# Patient Record
Sex: FEMALE | Race: BLACK OR AFRICAN AMERICAN | NOT HISPANIC OR LATINO
[De-identification: names, ages, dates, MRNs, and addresses within clinical notes are randomized per-mention and may not be internally consistent; named-entity substitution may affect disease eponyms.]

---

## 2019-01-01 PROBLEM — Z00.00 ENCOUNTER FOR PREVENTIVE HEALTH EXAMINATION: Status: ACTIVE | Noted: 2019-01-01

## 2019-01-08 ENCOUNTER — APPOINTMENT (OUTPATIENT)
Dept: OPHTHALMOLOGY | Facility: CLINIC | Age: 54
End: 2019-01-08
Payer: MEDICARE

## 2019-01-08 PROCEDURE — 92004 COMPRE OPH EXAM NEW PT 1/>: CPT

## 2019-02-07 ENCOUNTER — RX RENEWAL (OUTPATIENT)
Age: 54
End: 2019-02-07

## 2019-02-12 ENCOUNTER — APPOINTMENT (OUTPATIENT)
Dept: OPHTHALMOLOGY | Facility: CLINIC | Age: 54
End: 2019-02-12
Payer: MEDICARE

## 2019-02-12 PROCEDURE — 92014 COMPRE OPH EXAM EST PT 1/>: CPT

## 2019-02-12 PROCEDURE — 92134 CPTRZ OPH DX IMG PST SGM RTA: CPT

## 2019-03-13 ENCOUNTER — RX RENEWAL (OUTPATIENT)
Age: 54
End: 2019-03-13

## 2019-03-13 RX ORDER — PREDNISOLONE ACETATE 10 MG/ML
1 SUSPENSION/ DROPS OPHTHALMIC 4 TIMES DAILY
Qty: 1 | Refills: 3 | Status: ACTIVE | COMMUNITY
Start: 2019-01-11 | End: 1900-01-01

## 2019-03-15 ENCOUNTER — APPOINTMENT (OUTPATIENT)
Dept: OPHTHALMOLOGY | Facility: CLINIC | Age: 54
End: 2019-03-15
Payer: MEDICARE

## 2019-03-15 PROCEDURE — 76516 ECHO EXAM OF EYE: CPT

## 2019-03-15 PROCEDURE — 92014 COMPRE OPH EXAM EST PT 1/>: CPT

## 2019-03-15 RX ORDER — PREDNISONE 10 MG/1
10 TABLET ORAL
Qty: 42 | Refills: 3 | Status: ACTIVE | COMMUNITY
Start: 2019-03-15 | End: 1900-01-01

## 2019-03-20 ENCOUNTER — APPOINTMENT (OUTPATIENT)
Dept: OPHTHALMOLOGY | Facility: AMBULATORY SURGERY CENTER | Age: 54
End: 2019-03-20
Payer: MEDICARE

## 2019-03-20 ENCOUNTER — OUTPATIENT (OUTPATIENT)
Dept: OUTPATIENT SERVICES | Facility: HOSPITAL | Age: 54
LOS: 1 days | Discharge: HOME | End: 2019-03-20

## 2019-03-20 PROCEDURE — 67036 REMOVAL OF INNER EYE FLUID: CPT | Mod: RT

## 2019-03-20 PROCEDURE — 66986 EXCHANGE LENS PROSTHESIS: CPT | Mod: RT

## 2019-03-21 ENCOUNTER — APPOINTMENT (OUTPATIENT)
Dept: OPHTHALMOLOGY | Facility: CLINIC | Age: 54
End: 2019-03-21
Payer: MEDICARE

## 2019-03-21 ENCOUNTER — RX RENEWAL (OUTPATIENT)
Age: 54
End: 2019-03-21

## 2019-03-21 DIAGNOSIS — H57.10 OCULAR PAIN, UNSPECIFIED EYE: ICD-10-CM

## 2019-03-21 PROCEDURE — 99024 POSTOP FOLLOW-UP VISIT: CPT

## 2019-03-21 RX ORDER — PREDNISOLONE ACETATE 10 MG/ML
1 SUSPENSION/ DROPS OPHTHALMIC 4 TIMES DAILY
Qty: 1 | Refills: 3 | Status: ACTIVE | COMMUNITY
Start: 2019-03-21 | End: 1900-01-01

## 2019-03-21 RX ORDER — TRAMADOL HYDROCHLORIDE AND ACETAMINOPHEN 37.5; 325 MG/1; MG/1
37.5-325 TABLET, FILM COATED ORAL 3 TIMES DAILY
Qty: 20 | Refills: 0 | Status: ACTIVE | COMMUNITY
Start: 2019-03-21 | End: 1900-01-01

## 2019-03-21 RX ORDER — PREDNISONE 20 MG/1
20 TABLET ORAL DAILY
Qty: 90 | Refills: 2 | Status: ACTIVE | COMMUNITY
Start: 2019-03-21 | End: 1900-01-01

## 2019-03-21 RX ORDER — MOXIFLOXACIN OPHTHALMIC 5 MG/ML
0.5 SOLUTION/ DROPS OPHTHALMIC
Qty: 1 | Refills: 0 | Status: ACTIVE | COMMUNITY
Start: 2019-03-21 | End: 1900-01-01

## 2019-03-22 RX ORDER — PREDNISONE 10 MG/1
10 TABLET ORAL
Qty: 200 | Refills: 3 | Status: ACTIVE | COMMUNITY
Start: 2019-03-22 | End: 1900-01-01

## 2019-03-25 ENCOUNTER — APPOINTMENT (OUTPATIENT)
Dept: OPHTHALMOLOGY | Facility: CLINIC | Age: 54
End: 2019-03-25
Payer: MEDICARE

## 2019-03-25 ENCOUNTER — INPATIENT (INPATIENT)
Facility: HOSPITAL | Age: 54
LOS: 2 days | Discharge: HOME | DRG: 125 | End: 2019-03-28
Payer: MEDICARE

## 2019-03-25 VITALS
HEART RATE: 69 BPM | SYSTOLIC BLOOD PRESSURE: 163 MMHG | HEIGHT: 65 IN | TEMPERATURE: 98 F | RESPIRATION RATE: 18 BRPM | OXYGEN SATURATION: 97 % | WEIGHT: 175.93 LBS | DIASTOLIC BLOOD PRESSURE: 79 MMHG

## 2019-03-25 DIAGNOSIS — Z86.69 PERSONAL HISTORY OF OTHER DISEASES OF THE NERVOUS SYSTEM AND SENSE ORGANS: ICD-10-CM

## 2019-03-25 DIAGNOSIS — H26.9 UNSPECIFIED CATARACT: ICD-10-CM

## 2019-03-25 DIAGNOSIS — Z98.890 OTHER SPECIFIED POSTPROCEDURAL STATES: Chronic | ICD-10-CM

## 2019-03-25 DIAGNOSIS — R63.8 OTHER SYMPTOMS AND SIGNS CONCERNING FOOD AND FLUID INTAKE: ICD-10-CM

## 2019-03-25 DIAGNOSIS — Z86.19 PERSONAL HISTORY OF OTHER INFECTIOUS AND PARASITIC DISEASES: ICD-10-CM

## 2019-03-25 DIAGNOSIS — H27.10 UNSPECIFIED DISLOCATION OF LENS: ICD-10-CM

## 2019-03-25 DIAGNOSIS — C50.919 MALIGNANT NEOPLASM OF UNSPECIFIED SITE OF UNSPECIFIED FEMALE BREAST: ICD-10-CM

## 2019-03-25 DIAGNOSIS — H46.9 UNSPECIFIED OPTIC NEURITIS: ICD-10-CM

## 2019-03-25 DIAGNOSIS — Z91.89 OTHER SPECIFIED PERSONAL RISK FACTORS, NOT ELSEWHERE CLASSIFIED: ICD-10-CM

## 2019-03-25 DIAGNOSIS — Z90.13 ACQUIRED ABSENCE OF BILATERAL BREASTS AND NIPPLES: Chronic | ICD-10-CM

## 2019-03-25 DIAGNOSIS — H35.81 RETINAL EDEMA: ICD-10-CM

## 2019-03-25 LAB
ALBUMIN SERPL ELPH-MCNC: 4.1 G/DL — SIGNIFICANT CHANGE UP (ref 3.3–5)
ALP SERPL-CCNC: 87 U/L — SIGNIFICANT CHANGE UP (ref 40–120)
ALT FLD-CCNC: 20 U/L — SIGNIFICANT CHANGE UP (ref 10–45)
ANION GAP SERPL CALC-SCNC: 12 MMOL/L — SIGNIFICANT CHANGE UP (ref 5–17)
AST SERPL-CCNC: 14 U/L — SIGNIFICANT CHANGE UP (ref 10–40)
BASOPHILS # BLD AUTO: 0.02 K/UL — SIGNIFICANT CHANGE UP (ref 0–0.2)
BASOPHILS NFR BLD AUTO: 0.2 % — SIGNIFICANT CHANGE UP (ref 0–2)
BILIRUB SERPL-MCNC: 0.2 MG/DL — SIGNIFICANT CHANGE UP (ref 0.2–1.2)
BUN SERPL-MCNC: 20 MG/DL — SIGNIFICANT CHANGE UP (ref 7–23)
CALCIUM SERPL-MCNC: 9.5 MG/DL — SIGNIFICANT CHANGE UP (ref 8.4–10.5)
CHLORIDE SERPL-SCNC: 105 MMOL/L — SIGNIFICANT CHANGE UP (ref 96–108)
CO2 SERPL-SCNC: 24 MMOL/L — SIGNIFICANT CHANGE UP (ref 22–31)
CREAT SERPL-MCNC: 0.6 MG/DL — SIGNIFICANT CHANGE UP (ref 0.5–1.3)
CRP SERPL-MCNC: 0.26 MG/DL — SIGNIFICANT CHANGE UP (ref 0–0.4)
EOSINOPHIL # BLD AUTO: 0 K/UL — SIGNIFICANT CHANGE UP (ref 0–0.5)
EOSINOPHIL NFR BLD AUTO: 0 % — SIGNIFICANT CHANGE UP (ref 0–6)
ERYTHROCYTE [SEDIMENTATION RATE] IN BLOOD: 27 MM/HR — HIGH
EXTRA SST TUBE: SIGNIFICANT CHANGE UP
GLUCOSE BLDC GLUCOMTR-MCNC: 224 MG/DL — HIGH (ref 70–99)
GLUCOSE SERPL-MCNC: 207 MG/DL — HIGH (ref 70–99)
HCT VFR BLD CALC: 40.3 % — SIGNIFICANT CHANGE UP (ref 34.5–45)
HGB BLD-MCNC: 13.1 G/DL — SIGNIFICANT CHANGE UP (ref 11.5–15.5)
IMM GRANULOCYTES NFR BLD AUTO: 0.5 % — SIGNIFICANT CHANGE UP (ref 0–1.5)
INR BLD: 1.09 — SIGNIFICANT CHANGE UP (ref 0.88–1.16)
LYMPHOCYTES # BLD AUTO: 2.46 K/UL — SIGNIFICANT CHANGE UP (ref 1–3.3)
LYMPHOCYTES # BLD AUTO: 23.8 % — SIGNIFICANT CHANGE UP (ref 13–44)
MCHC RBC-ENTMCNC: 27.6 PG — SIGNIFICANT CHANGE UP (ref 27–34)
MCHC RBC-ENTMCNC: 32.5 GM/DL — SIGNIFICANT CHANGE UP (ref 32–36)
MCV RBC AUTO: 85 FL — SIGNIFICANT CHANGE UP (ref 80–100)
MONOCYTES # BLD AUTO: 0.09 K/UL — SIGNIFICANT CHANGE UP (ref 0–0.9)
MONOCYTES NFR BLD AUTO: 0.9 % — LOW (ref 2–14)
NEUTROPHILS # BLD AUTO: 7.71 K/UL — HIGH (ref 1.8–7.4)
NEUTROPHILS NFR BLD AUTO: 74.6 % — SIGNIFICANT CHANGE UP (ref 43–77)
NRBC # BLD: 0 /100 WBCS — SIGNIFICANT CHANGE UP (ref 0–0)
PLATELET # BLD AUTO: 323 K/UL — SIGNIFICANT CHANGE UP (ref 150–400)
POTASSIUM SERPL-MCNC: 3.7 MMOL/L — SIGNIFICANT CHANGE UP (ref 3.5–5.3)
POTASSIUM SERPL-SCNC: 3.7 MMOL/L — SIGNIFICANT CHANGE UP (ref 3.5–5.3)
PROT SERPL-MCNC: 7.9 G/DL — SIGNIFICANT CHANGE UP (ref 6–8.3)
PROTHROM AB SERPL-ACNC: 12.3 SEC — SIGNIFICANT CHANGE UP (ref 10–12.9)
RBC # BLD: 4.74 M/UL — SIGNIFICANT CHANGE UP (ref 3.8–5.2)
RBC # FLD: 14.1 % — SIGNIFICANT CHANGE UP (ref 10.3–14.5)
SODIUM SERPL-SCNC: 141 MMOL/L — SIGNIFICANT CHANGE UP (ref 135–145)
WBC # BLD: 10.33 K/UL — SIGNIFICANT CHANGE UP (ref 3.8–10.5)
WBC # FLD AUTO: 10.33 K/UL — SIGNIFICANT CHANGE UP (ref 3.8–10.5)

## 2019-03-25 PROCEDURE — 99223 1ST HOSP IP/OBS HIGH 75: CPT | Mod: GC

## 2019-03-25 PROCEDURE — 70543 MRI ORBT/FAC/NCK W/O &W/DYE: CPT | Mod: 26

## 2019-03-25 PROCEDURE — 92014 COMPRE OPH EXAM EST PT 1/>: CPT | Mod: 24

## 2019-03-25 PROCEDURE — 93010 ELECTROCARDIOGRAM REPORT: CPT

## 2019-03-25 PROCEDURE — 99285 EMERGENCY DEPT VISIT HI MDM: CPT | Mod: 25

## 2019-03-25 RX ORDER — PANTOPRAZOLE SODIUM 20 MG/1
40 TABLET, DELAYED RELEASE ORAL
Qty: 0 | Refills: 0 | Status: DISCONTINUED | OUTPATIENT
Start: 2019-03-25 | End: 2019-03-28

## 2019-03-25 RX ORDER — DEXTROSE 50 % IN WATER 50 %
15 SYRINGE (ML) INTRAVENOUS ONCE
Qty: 0 | Refills: 0 | Status: DISCONTINUED | OUTPATIENT
Start: 2019-03-25 | End: 2019-03-28

## 2019-03-25 RX ORDER — DEXTROSE 50 % IN WATER 50 %
25 SYRINGE (ML) INTRAVENOUS ONCE
Qty: 0 | Refills: 0 | Status: DISCONTINUED | OUTPATIENT
Start: 2019-03-25 | End: 2019-03-28

## 2019-03-25 RX ORDER — INSULIN LISPRO 100/ML
VIAL (ML) SUBCUTANEOUS
Qty: 0 | Refills: 0 | Status: DISCONTINUED | OUTPATIENT
Start: 2019-03-25 | End: 2019-03-28

## 2019-03-25 RX ORDER — CIPROFLOXACIN HCL 0.3 %
1 DROPS OPHTHALMIC (EYE)
Qty: 0 | Refills: 0 | Status: DISCONTINUED | OUTPATIENT
Start: 2019-03-25 | End: 2019-03-28

## 2019-03-25 RX ORDER — MOXIFLOXACIN HCL 0.5 %
1 DROPS OPHTHALMIC (EYE)
Qty: 0 | Refills: 0 | Status: DISCONTINUED | OUTPATIENT
Start: 2019-03-25 | End: 2019-03-25

## 2019-03-25 RX ORDER — DEXTROSE 50 % IN WATER 50 %
12.5 SYRINGE (ML) INTRAVENOUS ONCE
Qty: 0 | Refills: 0 | Status: DISCONTINUED | OUTPATIENT
Start: 2019-03-25 | End: 2019-03-28

## 2019-03-25 RX ORDER — PREDNISOLONE SODIUM PHOSPHATE 1 %
1 DROPS OPHTHALMIC (EYE)
Qty: 0 | Refills: 0 | Status: DISCONTINUED | OUTPATIENT
Start: 2019-03-25 | End: 2019-03-28

## 2019-03-25 RX ORDER — GLUCAGON INJECTION, SOLUTION 0.5 MG/.1ML
1 INJECTION, SOLUTION SUBCUTANEOUS ONCE
Qty: 0 | Refills: 0 | Status: DISCONTINUED | OUTPATIENT
Start: 2019-03-25 | End: 2019-03-28

## 2019-03-25 RX ORDER — SODIUM CHLORIDE 9 MG/ML
1000 INJECTION, SOLUTION INTRAVENOUS
Qty: 0 | Refills: 0 | Status: DISCONTINUED | OUTPATIENT
Start: 2019-03-25 | End: 2019-03-28

## 2019-03-25 RX ADMIN — Medication 50 MILLIGRAM(S): at 18:55

## 2019-03-25 RX ADMIN — Medication 1 DROP(S): at 22:36

## 2019-03-25 RX ADMIN — Medication 50 MILLIGRAM(S): at 22:36

## 2019-03-25 RX ADMIN — Medication 2: at 22:36

## 2019-03-25 NOTE — H&P ADULT - PROBLEM SELECTOR PLAN 1
Pt with sudden onset redness, pain, and NLP on post-op day 1 of lens exchange. Vision improved slightly with PO steroids, now Light perception.   -1g solumedrol QD x3 days  -MR orbit  -appreciate ophtho recs. Pt with sudden onset redness, pain, and NLP on post-op day 1 of lens exchange. Vision improved slightly with PO steroids, now Light perception. no APD noted on exam.  Differential includes inflammatory (MS, NMO) vs ischemic (no risk factors) vs infectious (not febrile, no leukocytosis), Pt denies peripheral neuropathy, facial droop, or dysarthria. However does endorse occasional vertigo since february and one fall in february 2/2 knee weakness.   -1g solumedrol QD x3 days  -MR orbit  -appreciate ophtho recs. Pt with sudden onset redness, pain, and NLP on post-op day 1 of lens exchange. Vision improved slightly with PO steroids, now Light perception. no APD noted on exam.  Differential includes inflammatory (MS, NMO) vs ischemic (no risk factors) vs infectious (not febrile, no leukocytosis), Pt denies peripheral neuropathy, facial droop, or dysarthria. However does endorse occasional vertigo since february and one fall in february 2/2 knee weakness.   -1g solumedrol QD x3 days with daily PPI   -MR orbit urgent  -appreciate ophtho recs  -call dr. flores in the AM for collateral

## 2019-03-25 NOTE — ED PROVIDER NOTE - CLINICAL SUMMARY MEDICAL DECISION MAKING FREE TEXT BOX
+ traumatic optic neuropathy, admission for MR and 3 days of IV solumedrol 1 G daily as per Dr. De Guzman's recommendations.

## 2019-03-25 NOTE — ED ADULT NURSE NOTE - OBJECTIVE STATEMENT
53 y/o female w/ hx of cataract surgery on right eye presents to the ED c/ 53 y/o female w/ hx of cataract surgery on right eye 5 days ago presents to ED for IV Solumedrol for optic neuropathy. Denies pain. Pt reports slight blurry vision. Denies headache, neck pain, double vision, or any other associated sx.

## 2019-03-25 NOTE — H&P ADULT - PROBLEM SELECTOR PLAN 9
F: none  E: replete PRN  N: regular  VTE: SCD (padua score 2 for recent sugery)    dispo: F F: none  E: replete PRN  N: regular  VTE: SCD (padua score 2 for recent sugery)  GI: PPI PO QD  dispo: F

## 2019-03-25 NOTE — H&P ADULT - NSICDXFAMILYHX_GEN_ALL_CORE_FT
FAMILY HISTORY:  Family history of breast cancer, Aunt  FH: diabetes mellitus, Dad  FHx: hypertension, Mom  FHx: prostate cancer, Dad

## 2019-03-25 NOTE — H&P ADULT - PROBLEM SELECTOR PLAN 10
Transition of Care   1) PCP Contacted on Admission: (Y/N) --> Name & Phone #: Dr. De Guzman (ophth), Dr. Mcginnis (neuroophth), N. Spoke to Dr. Armstrong, ophthalmologist on call  2) Date of Contact with PCP:  3) PCP Contacted at Discharge: (Y/N)  4) Summary of Handoff Given to PCP:   5) Post-Discharge Appointment Date and Location:

## 2019-03-25 NOTE — ED PROVIDER NOTE - EYES, MLM
RT eye w conjunctival edema and hemorrhage, . +EOM's , bilateral cataract prostetic lens visible.( pt came from neurooptho office after complete eye exam)

## 2019-03-25 NOTE — ED PROVIDER NOTE - PMH
Bilateral cataracts    Breast cancer    H/O Bell's palsy    H/O syphilis    History of uveitis    Lens dislocation    Retinal edema of both eyes

## 2019-03-25 NOTE — H&P ADULT - PROBLEM SELECTOR PLAN 3
pt with hx of uveitis reportedly using prednisolone gtt PRN pt with hx of uveitis reportedly using prednisolone gtt PRN  -c/w prednisolone gtt OD QID  -pending ophtho recs (on call Dr. Armstrong) if pt needs OS gtt for uveitis pt with hx of uveitis   -NTD

## 2019-03-25 NOTE — H&P ADULT - NSHPPHYSICALEXAM_GEN_ALL_CORE
Constitutional: WDWN resting comfortably in bed; NAD  Head: NC/AT  Eyes: vision: LP OD, 20/20 OS. pupils irregular b/l (surgical pupils), constrictive to light, -rAPD. Diffuse conjunctival erythema OD, clear conjunctiva OS. EOMI.  ENT: no nasal discharge; MMM  Respiratory: CTA B/L; no W/R/R, no retractions  Cardiac: +S1/S2; RRR; no M/R/G  Gastrointestinal: obese, soft, NT/ND; no rebound or guarding; normoactive BS  Back: spine midline  Extremities: WWP, no clubbing or cyanosis; no peripheral edema  Dermatologic: skin warm, dry and intact; no rashes, wounds, or scars  Psychiatric: affect and characteristics of appearance, verbalizations, behaviors are appropriate Constitutional: WDWN resting comfortably in bed; NAD  Head: NC/AT  Eyes: vision: LP w/o projection OD, 20/20 OS. pupils irregular b/l (surgical pupils), constrictive to light, -rAPD. Diffuse conjunctival erythema OD, clear conjunctiva OS. EOMI. No tenderness, photophobia, or discharge  ENT: no nasal discharge; MMM  Respiratory: CTA B/L; no W/R/R, no retractions  Cardiac: +S1/S2; RRR; no M/R/G  Gastrointestinal: obese, soft, NT/ND; no rebound or guarding; normoactive BS  Back: spine midline  Extremities: WWP, no clubbing or cyanosis; no peripheral edema  Dermatologic: skin warm, dry and intact; no rashes, wounds, or scars  Psychiatric: affect and characteristics of appearance, verbalizations, behaviors are appropriate

## 2019-03-25 NOTE — H&P ADULT - PROBLEM SELECTOR PLAN 6
pt with hx of b/l cataracts s/p IOL b/l, c/b lens dislocation OD  NTD pt with hx of b/l cataracts s/p IOL b/l, c/b lens dislocation OD  -NTD

## 2019-03-25 NOTE — H&P ADULT - NSHPLABSRESULTS_GEN_ALL_CORE
13.1   10.33 )-----------( 323      ( 25 Mar 2019 15:39 )             40.3     03-25    141  |  105  |  20  ----------------------------<  207<H>  3.7   |  24  |  0.60    Ca    9.5      25 Mar 2019 15:39    TPro  7.9  /  Alb  4.1  /  TBili  0.2  /  DBili  x   /  AST  14  /  ALT  20  /  AlkPhos  87  03-25    PT/INR - ( 25 Mar 2019 15:39 )   PT: 12.3 sec;   INR: 1.09          RADIOLOGY, EKG & ADDITIONAL TESTS: Reviewed.

## 2019-03-25 NOTE — H&P ADULT - HISTORY OF PRESENT ILLNESS
Pt is a 53 yo F with a PMH of __, presenting with ___. Pt is a 53 yo F with a PMH of breast ca s/p chemo/radiation/b/l mastectomy with reconstruction, syphilis (s/p treatment), uveitis, cataracts, retinal swelling, presenting with decreased vision and redness of R eye since 3/21. On 3/20, pt went in for a R lens exchange for a dislocated lens with Dr. De Guzman. On post-op day 1 pt had noted she had no vision (no light perception), which was a change from prior 20/40 vision. She also had redness of her eye. The redness persisted, while her vision improved to light perception in the past few days. She also reports 6/10 pain in that eye, dull and intermittent. Denies photophobia, flashes, floaters. Also denies headache, dizziness, F/C/S, CP, SOB, sore throat. On post-op day 1 she was started on prednisone 100mg PO QD for suspected optic neuropathy without improvement. Today she was seen by neuroophtholmologist Dr. Mcginnis, who recommended admission for IV abx. Pt is a 55 yo F with a PMH of breast ca s/p chemo/radiation/b/l mastectomy with reconstruction, syphilis (s/p treatment), [place] palsy, uveitis, cataracts, retinal swelling, presenting with decreased vision, redness, and pain (6/10, dull, intermittent) of R eye since 3/21. On 3/20, pt went in for a R lens exchange for a dislocated lens with Dr. De Guzman. On post-op day 1 pt had noted she had no vision (no light perception), which was a change from prior 20/40 vision, along with redness and pain. She was started on prednisone 100mg PO QD for suspected optic neuropathy. The redness and pain persisted, while her vision minimally improved to light perception in the past few days. Denies photophobia, flashes, floaters. Also denies headache, dizziness, F/C/S, CP, SOB, sore throat.  Today she was seen by neuroophtholmologist Dr. Mcginnis, who recommended admission for IV steroids.    Vitals in the ED:  T(F): 98.6   HR: 62 - 69  BP: 120/87 - 163/79  RR: 18   SpO2: 95% - 97%    Labs notable for ESR of 27. Pt given 500mg solumedrol IVPB and admitted to [facility]. Pt is a 53 yo F with a PMH of breast ca s/p chemo/radiation/b/l mastectomy with reconstruction in 2014, syphilis (s/p treatment), [place] palsy in 2016, uveitis, cataracts, retinal swelling, presenting with decreased vision, redness, and pain (6/10, dull, intermittent) of R eye since 3/21. On 3/20, pt went in for a R lens exchange for a dislocated lens with Dr. De Guzman. On post-op day 1 pt had noted she had no vision (no light perception), which was a change from prior 20/40 vision, along with redness and pain. She was started on prednisone 100mg PO QD for suspected optic neuropathy. The redness and pain persisted, while her vision minimally improved to light perception in the past few days. Denies photophobia, flashes, floaters, peripheral neuropathy, facial droop, or dysarthria. However does endorse occasional vertigo since february and one fall in february 2/2 knee weakness. Pt denies headache, F/C/S, CP, SOB, sore throat.  Today she was seen by neuroophtholmologist Dr. Mcginnis, who recommended admission for IV steroids.    Vitals in the ED:  T(F): 98.6   HR: 62 - 69  BP: 120/87 - 163/79  RR: 18   SpO2: 95% - 97%    Labs notable for ESR of 27. Pt given 500mg solumedrol IVPB and admitted to [facility]. Pt is a 55 yo F with a PMH of breast ca s/p chemo/radiation/b/l mastectomy with reconstruction in 2014, syphilis (s/p treatment), [place] palsy in 2016, uveitis, cataracts, retinal swelling, presenting with decreased vision, redness, and pain (6/10, dull, intermittent) of R eye since 3/21. On 3/20, pt went in for a R lens exchange for a dislocated lens with Dr. De Guzman. On post-op day 1 pt had noted she had no vision (no light perception), which was a change from prior 20/40 vision, along with redness and pain. She was started on prednisone 100mg PO QD for suspected optic neuropathy. The redness and pain persisted, while her vision minimally improved to light perception in the past few days. Denies eye discharge, photophobia, flashes, floaters, peripheral neuropathy, facial droop, or dysarthria. However does endorse occasional vertigo since february and one fall in february 2/2 knee weakness. Pt denies headache, F/C/S, CP, SOB, sore throat.  Today she was seen by neuroophtholmologist Dr. Mcginnis, who recommended admission for IV steroids.    Vitals in the ED:  T(F): 98.6   HR: 62 - 69  BP: 120/87 - 163/79  RR: 18   SpO2: 95% - 97%    Labs notable for ESR of 27. Pt given 500mg solumedrol IVPB and admitted to [facility].

## 2019-03-25 NOTE — H&P ADULT - PROBLEM SELECTOR PLAN 5
pt with hx of bells palsy, self resolved  -NTD pt with hx of bells palsy in 2016, self resolved  -NTD

## 2019-03-25 NOTE — H&P ADULT - NSICDXPASTMEDICALHX_GEN_ALL_CORE_FT
PAST MEDICAL HISTORY:  Bilateral cataracts     Breast cancer     H/O Bell's palsy     H/O syphilis     History of uveitis     Lens dislocation     Retinal edema of both eyes

## 2019-03-25 NOTE — H&P ADULT - PROBLEM SELECTOR PLAN 4
pt with a hx of breast ca, s/p b/l mastectomy with reconstruction, chemo, and radiation  -NTD pt with a hx of breast ca, s/p b/l mastectomy with reconstruction, chemo, and radiation in 2014  -NTD

## 2019-03-25 NOTE — ED PROVIDER NOTE - OBJECTIVE STATEMENT
53 yo with rt cataract surgery last week , sent in for admission due to complication, pt with diminished vision and diagnosis of traumatic optic neuropathy after retrobulbar anesthesia injection. pt reports she had full loss of vision but now some light perception after several days of prednisone. no eye pain or discharge . no fevers.

## 2019-03-25 NOTE — H&P ADULT - ASSESSMENT
Pt is a 55 yo F with a PMH of breast ca s/p chemo/radiation/b/l mastectomy with reconstruction, syphilis (s/p treatment), [place] palsy, uveitis, cataracts, retinal swelling, presenting with decreased vision, redness, and pain (6/10, dull, intermittent) of R eye since 3/21 (post-op day 1 of R lens exchange), admitted for IV steroids for suspected optic neuropathy.

## 2019-03-25 NOTE — H&P ADULT - PROBLEM SELECTOR PLAN 2
Pt with a hx of lens dislocation, s/p lens exchange on 3/20  -c/w prednisolone gtt OD QID  -c/w moxifloxacin gtt OD QID Pt with a hx of lens dislocation, s/p lens exchange on 3/20  -c/w prednisolone gtt OD QID  - moxifloxacin non-formulary, c/w cipro gtt OD QID

## 2019-03-26 LAB
EXTRA GREEN TOP TUBE: SIGNIFICANT CHANGE UP
EXTRA LAVENDER TOP TUBE: SIGNIFICANT CHANGE UP
HBA1C BLD-MCNC: 6 % — HIGH (ref 4–5.6)

## 2019-03-26 PROCEDURE — 99233 SBSQ HOSP IP/OBS HIGH 50: CPT | Mod: GC

## 2019-03-26 RX ORDER — MOXIFLOXACIN HCL 0.5 %
1 DROPS OPHTHALMIC (EYE)
Qty: 0 | Refills: 0 | COMMUNITY

## 2019-03-26 RX ORDER — PREDNISOLONE SODIUM PHOSPHATE 1 %
1 DROPS OPHTHALMIC (EYE)
Qty: 0 | Refills: 0 | COMMUNITY

## 2019-03-26 RX ADMIN — Medication 1 DROP(S): at 12:33

## 2019-03-26 RX ADMIN — Medication 29 MILLIGRAM(S): at 18:22

## 2019-03-26 RX ADMIN — Medication 1 DROP(S): at 06:37

## 2019-03-26 RX ADMIN — Medication 1: at 22:30

## 2019-03-26 RX ADMIN — Medication 1: at 12:33

## 2019-03-26 RX ADMIN — Medication 1 DROP(S): at 18:22

## 2019-03-26 RX ADMIN — PANTOPRAZOLE SODIUM 40 MILLIGRAM(S): 20 TABLET, DELAYED RELEASE ORAL at 06:36

## 2019-03-26 NOTE — PROGRESS NOTE ADULT - PROBLEM SELECTOR PLAN 1
Pt with sudden onset redness, pain, and NLP on post-op day 1 of lens exchange. Vision improved slightly with PO steroids to Light perception. +rAPD on Right Differential includes inflammatory (MS, NMO) vs ischemic (no risk factors) vs infectious (not febrile, no leukocytosis), Pt denies peripheral neuropathy, facial droop, or dysarthria. However does endorse occasional vertigo since february and one fall in february 2/2 knee weakness.   -1g solumedrol QD x3 days with ISS and daily PPI   - vision improved to LP with projection OD today  -MR orbit read as normal, pending input from neuro-ophthalmologist  -need to contact dr. flores for collateral Pt with sudden onset redness, pain, and NLP on post-op day 1 of lens exchange. Vision improved slightly with PO steroids to Light perception. +rAPD on Right Differential includes inflammatory (MS, NMO) vs ischemic (no risk factors) vs infectious (not febrile, no leukocytosis), Pt denies peripheral neuropathy, facial droop, or dysarthria. However does endorse occasional vertigo since february and one fall in february 2/2 knee weakness.   -1g solumedrol QD x3 days with ISS and daily PPI   - vision improved to LP with projection OD today  -MR orbit read as normal, pending input from neuro-ophthalmologist  -need to contact dr. flores for collateral    #pre-diabetes  Pt found to have Hb A1c 6.0 on admission  -needs outpatient followup Pt with sudden onset redness, pain, and NLP on post-op day 1 of lens exchange. Vision improved slightly with PO steroids to Light perception. +rAPD on Right Differential includes traumatic v inflammatory (MS, NMO) vs ischemic (no risk factors) vs infectious (not febrile, no leukocytosis), Pt denies peripheral neuropathy, facial droop, or dysarthria. However does endorse occasional vertigo since february and one fall in february 2/2 knee weakness. Most likely traumatic 2/2 retrobulbar anesthesia injection for surgery  -1g solumedrol QD x3 days with ISS and daily PPI   - vision improved to LP with projection OD today  -MR orbit read as normal, pending input from neuro-ophthalmologist  -needs f/u with Dr De Guzman in 1 week    #pre-diabetes  Pt found to have Hb A1c 6.0 on admission  -needs outpatient followup

## 2019-03-26 NOTE — PROGRESS NOTE ADULT - ATTENDING COMMENTS
Patient was seen and examined with the resident team today.  I agree with Dr. Greene's assessment and plan with the following exceptions/additions:     Briefly, this is a 53yo woman with a PMH of breast ca s/p chemoXRT and b/l mastectomy with reconstruction, syphilis (s/p treatment), [place] palsy and various bilateral ophthalmological complications who presented from Ophtho clinic with near complete, post-op R-sided vision loss following a R lens exchange on 3/20 (w/o improvement on PO steroids) c/f optic neuropathy.  She was started on IV steroids overnight per Dr. Mcginnis (pt's ophthoneurologist).  MRI of orbit w/no acute orbital findings; however, with some signaling abnormalities that are periventricular and periarterial.  She reports again near complete R visual field impairment; maybe can see a dark shadow.  Denies HA or pain in her eye.  VSS.  Exam - L pupil sluggishly reactive to L, R pupil w/o reaction to light; R eye diffuse erythematous, ?mild periorbital swelling but no tenderness to palpation; CTA B no w/r/r, RRR no m/g/r, obese +BS soft NTND, WWP, AOx3, pleasant/conversant/appropriate.  Labs - CRP 0.26, ESR 27.    -- would discuss MRI with Ophtho w/low threshold to pursue MRI of the brain w/Neuro consult  -- Optho following, appreciate assistance   -- c/w IV steroids x 3 days (3/25-3/27)  -- monitor FS given IV steroids and pre-diatbetes A1c  -- remainder of plan as per above  -- DVT PPx - SCD  -- Dispo - TBD    Janie Méndez  [phone] Patient was seen and examined with the resident team today.  I agree with Dr. Greene's assessment and plan with the following exceptions/additions:     Briefly, this is a 53yo woman with a PMH of breast ca s/p chemoXRT and b/l mastectomy with reconstruction, syphilis (s/p treatment), [place] palsy and various bilateral ophthalmological complications who presented from Ophtho clinic with sudden, near complete post-op R-sided vision loss following a R lens exchange on 3/20 (w/o improvement on PO steroids) c/f optic neuropathy.  She was started on IV steroids overnight per Dr. Mcginnis (pt's ophthoneurologist).  MRI of orbit w/no acute orbital findings; however, with some signaling abnormalities that are periventricular and periarterial.  She reports again near complete R visual field impairment; maybe can see a dark shadow.  Denies HA or pain in her eye.  VSS.  Exam - L pupil sluggishly reactive to L, R pupil w/o reaction to light; R eye diffuse erythematous, ?mild periorbital swelling but no tenderness to palpation; CTA B no w/r/r, RRR no m/g/r, obese +BS soft NTND, WWP, AOx3, pleasant/conversant/appropriate.  Labs - CRP 0.26, ESR 27.    -- would discuss MRI with Ophtho w/low threshold to pursue MRI of the brain w/Neuro consult  -- Optho following, appreciate assistance   -- c/w IV steroids x 3 days (3/25-3/27)  -- monitor FS given IV steroids and pre-diatbetes A1c  -- remainder of plan as per above  -- DVT PPx - SCD  -- Dispo - TBD    Janie Méndez  [phone]

## 2019-03-26 NOTE — CONSULT NOTE ADULT - SUBJECTIVE AND OBJECTIVE BOX
55 yo Female with history of dislocated intraocular lens, uveitis, cystoid macular edema OD, anterior chamber intraocular lens OS, status post intraocular lens exchange OD 3/20/19 with loss of vision noted on POD #1. Sent in by Dr. Mcginnis ([facility]) and Dr. De Guzman for IV solumedrol for right optic neuropathy. Denies any pain OD but vision still poor.    PMH - Breast ca s/p chemo/radiation/bl mastectomy, syphilis s/p treatment, Bell's palsy  Meds - Methylprednisolone 1gm IV daily for 3 days, pantoprazole  POH - Cataract extraction OU, Dislocated lens OD with cystoid macular edema and uveitis  Gtts - Prednisolone 1% 1 gtt OD 4x/day, ciprofloxacin 1 gtt OD 4x/day  Allergies - No Known Allergies    ROS - No fever, SOB, palpitations, nausea, vomiting, joint pain    Va, sc - Bare LP OD  Pupils - Surgical pupil OU. +APD OD.  EOMs - Full OU  CVF - Unable OD. Full OS.    PLE  LLA - Flat OU  C/S - 1-2+ conjunctival injection with subconjunctival hemorrhage OD. Conj sutures OD.  K - Grossly clear OU  A/C - Grossly quiet OU  Iris - Flat OU  Lens - Sutured IOL OD, ACIOL OS  IOP - Soft OD.     MRI orbits - Normal MRI of the orbits.

## 2019-03-26 NOTE — CONSULT NOTE ADULT - ASSESSMENT
53 yo F with right optic neuropathy status post intraocular lens exchange 3/20/19.    - MRI orbits negative. No evidence of enhancement. Scans reviewed with Dr. Mcginnis and radiology.  - Continue with IV solumedrol 1gm daily for 3 days.  - Continue with prednisolone 1% 1 gtt OD 4x/day.  - Continue with ciprofloxacin 1 gtt OD 4x/day.  - Follow-up with Dr. De Guzman upon discharge on Thursday or Friday.

## 2019-03-27 LAB
ANION GAP SERPL CALC-SCNC: 8 MMOL/L — SIGNIFICANT CHANGE UP (ref 5–17)
BUN SERPL-MCNC: 25 MG/DL — HIGH (ref 7–23)
CALCIUM SERPL-MCNC: 9.2 MG/DL — SIGNIFICANT CHANGE UP (ref 8.4–10.5)
CHLORIDE SERPL-SCNC: 104 MMOL/L — SIGNIFICANT CHANGE UP (ref 96–108)
CO2 SERPL-SCNC: 28 MMOL/L — SIGNIFICANT CHANGE UP (ref 22–31)
CREAT SERPL-MCNC: 0.65 MG/DL — SIGNIFICANT CHANGE UP (ref 0.5–1.3)
GLUCOSE SERPL-MCNC: 161 MG/DL — HIGH (ref 70–99)
HCT VFR BLD CALC: 38 % — SIGNIFICANT CHANGE UP (ref 34.5–45)
HGB BLD-MCNC: 11.8 G/DL — SIGNIFICANT CHANGE UP (ref 11.5–15.5)
MAGNESIUM SERPL-MCNC: 2.2 MG/DL — SIGNIFICANT CHANGE UP (ref 1.6–2.6)
MCHC RBC-ENTMCNC: 27.2 PG — SIGNIFICANT CHANGE UP (ref 27–34)
MCHC RBC-ENTMCNC: 31.1 GM/DL — LOW (ref 32–36)
MCV RBC AUTO: 87.6 FL — SIGNIFICANT CHANGE UP (ref 80–100)
NRBC # BLD: 0 /100 WBCS — SIGNIFICANT CHANGE UP (ref 0–0)
PLATELET # BLD AUTO: 306 K/UL — SIGNIFICANT CHANGE UP (ref 150–400)
POTASSIUM SERPL-MCNC: 4 MMOL/L — SIGNIFICANT CHANGE UP (ref 3.5–5.3)
POTASSIUM SERPL-SCNC: 4 MMOL/L — SIGNIFICANT CHANGE UP (ref 3.5–5.3)
RBC # BLD: 4.34 M/UL — SIGNIFICANT CHANGE UP (ref 3.8–5.2)
RBC # FLD: 14.3 % — SIGNIFICANT CHANGE UP (ref 10.3–14.5)
SODIUM SERPL-SCNC: 140 MMOL/L — SIGNIFICANT CHANGE UP (ref 135–145)
WBC # BLD: 13.19 K/UL — HIGH (ref 3.8–10.5)
WBC # FLD AUTO: 13.19 K/UL — HIGH (ref 3.8–10.5)

## 2019-03-27 PROCEDURE — 99233 SBSQ HOSP IP/OBS HIGH 50: CPT | Mod: GC

## 2019-03-27 RX ADMIN — Medication 29 MILLIGRAM(S): at 18:15

## 2019-03-27 RX ADMIN — Medication 1 DROP(S): at 11:17

## 2019-03-27 RX ADMIN — Medication 1 DROP(S): at 06:54

## 2019-03-27 RX ADMIN — Medication 1 DROP(S): at 00:15

## 2019-03-27 RX ADMIN — Medication 1 DROP(S): at 18:01

## 2019-03-27 RX ADMIN — Medication 1: at 22:40

## 2019-03-27 RX ADMIN — Medication 4: at 12:26

## 2019-03-27 RX ADMIN — PANTOPRAZOLE SODIUM 40 MILLIGRAM(S): 20 TABLET, DELAYED RELEASE ORAL at 06:53

## 2019-03-27 RX ADMIN — Medication 1 DROP(S): at 06:55

## 2019-03-27 NOTE — PROGRESS NOTE ADULT - ASSESSMENT
Pt is a 53 yo F with a PMH of breast ca s/p chemo/radiation/b/l mastectomy with reconstruction, syphilis (s/p treatment), [place] palsy, uveitis, cataracts, retinal swelling, presenting with decreased vision, redness, and pain (6/10, dull, intermittent) of R eye since 3/21 (post-op day 1 of R lens exchange), admitted for IV steroids for suspected optic neuropathy.
Pt is a 53 yo F with a PMH of breast ca s/p chemo/radiation/b/l mastectomy with reconstruction, syphilis (s/p treatment), [place] palsy, uveitis, cataracts, retinal swelling, presenting with decreased vision, redness, and pain (6/10, dull, intermittent) of R eye since 3/21 (post-op day 1 of R lens exchange), admitted for IV steroids for traumatic optic neuropathy 2/2 retrobulbar anesthesia injection.

## 2019-03-27 NOTE — PROGRESS NOTE ADULT - PROBLEM SELECTOR PLAN 6
pt with hx of b/l cataracts s/p IOL b/l, c/b lens dislocation OD  -NTD
pt with hx of b/l cataracts s/p IOL b/l, c/b lens dislocation OD  -NTD

## 2019-03-27 NOTE — PROGRESS NOTE ADULT - PROBLEM SELECTOR PLAN 1
Pt with sudden onset redness, pain, and NLP on post-op day 1 of lens exchange. Vision improved slightly with PO steroids to Light perception. +rAPD on Right Differential includes traumatic v inflammatory (MS, NMO) vs ischemic (no risk factors) vs infectious (not febrile, no leukocytosis), Pt denies peripheral neuropathy, facial droop, or dysarthria. However does endorse occasional vertigo since february and one fall in february 2/2 knee weakness. Most likely traumatic 2/2 retrobulbar anesthesia injection for surgery  -1g solumedrol QD x3 days (day 3) with ISS and daily PPI   - vision LP with projection OD   -MR orbit read as normal  -will f/u with Dr De Guzman tomorrow after discharge    #pre-diabetes  Pt found to have Hb A1c 6.0 on admission  -needs outpatient followup Pt with sudden onset redness, pain, and NLP on post-op day 1 of lens exchange. Vision improved slightly with PO steroids to Light perception. +rAPD on Right Differential includes traumatic v inflammatory (MS, NMO) vs ischemic (no risk factors) vs infectious (not febrile, no leukocytosis), Pt denies peripheral neuropathy, facial droop, or dysarthria. However does endorse occasional vertigo since february and one fall in february 2/2 knee weakness. Most likely traumatic 2/2 retrobulbar anesthesia injection for surgery  -1g solumedrol QD x3 days (day 3) with ISS and daily PPI   - vision LP with projection OD   -MR orbit read as normal  -will f/u with Dr De Guzman next wk    #pre-diabetes  Pt found to have Hb A1c 6.0 on admission  -needs outpatient followup

## 2019-03-27 NOTE — PROGRESS NOTE ADULT - PROBLEM SELECTOR PLAN 5
pt with hx of bells palsy in 2016, self resolved  -NTD
pt with hx of bells palsy in 2016, self resolved  -NTD

## 2019-03-27 NOTE — PROGRESS NOTE ADULT - SUBJECTIVE AND OBJECTIVE BOX
Overnight: BAILEY    Subjective: Pt seen and examined at bedside. R eye pain has resolved, but continues to have poor vision.     VITAL SIGNS:  T(C): 36.6 (03-26-19 @ 09:03), Max: 37 (03-25-19 @ 15:51)  T(F): 97.9 (03-26-19 @ 09:03), Max: 98.6 (03-25-19 @ 15:51)  HR: 63 (03-26-19 @ 09:03) (59 - 72)  BP: 135/81 (03-26-19 @ 09:03) (120/87 - 163/79)  BP(mean): --  RR: 20 (03-26-19 @ 09:03) (18 - 20)  SpO2: 100% (03-26-19 @ 09:03) (95% - 100%)  Wt(kg): --    PHYSICAL EXAM:    Constitutional: WDWN resting comfortably in bed; NAD  Head: NC/AT  Eyes: LP w projection OD, 20/20 OS. pupils irregular b/l (surgical pupils), constrictive to light, +rAPD on Right. Diffuse conjunctival erythema OD improved from yesterday, clear conjunctiva OS. EOMI. No photophobia, discharge, nystagmus  ENT: no nasal discharge;  MMM  Respiratory: CTA B/L; no W/R/R, no retractions  Cardiac: +S1/S2; RRR; no M/R/G  Gastrointestinal: soft, NT/ND; no rebound or guarding; normoactive BS  Extremities: WWP, no clubbing or cyanosis; no peripheral edema  Dermatologic: skin warm, dry and intact; no rashes, wounds, or scars  Psychiatric: affect and characteristics of appearance, verbalizations, behaviors are appropriate    MEDICATIONS  (STANDING):  ciprofloxacin  0.3% Ophthalmic Solution 1 Drop(s) Right EYE four times a day  dextrose 5%. 1000 milliLiter(s) (50 mL/Hr) IV Continuous <Continuous>  dextrose 50% Injectable 12.5 Gram(s) IV Push once  dextrose 50% Injectable 25 Gram(s) IV Push once  dextrose 50% Injectable 25 Gram(s) IV Push once  insulin lispro (HumaLOG) corrective regimen sliding scale   SubCutaneous Before meals and at bedtime  methylPREDNISolone sodium succinate IVPB 1000 milliGRAM(s) IV Intermittent every 24 hours  pantoprazole    Tablet 40 milliGRAM(s) Oral before breakfast  prednisoLONE acetate 1% Suspension 1 Drop(s) Right EYE four times a day    MEDICATIONS  (PRN):  dextrose 40% Gel 15 Gram(s) Oral once PRN Blood Glucose LESS THAN 70 milliGRAM(s)/deciliter  glucagon  Injectable 1 milliGRAM(s) IntraMuscular once PRN Glucose LESS THAN 70 milligrams/deciliter    .  LABS:                         13.1   10.33 )-----------( 323      ( 25 Mar 2019 15:39 )             40.3     03-25    141  |  105  |  20  ----------------------------<  207<H>  3.7   |  24  |  0.60    Ca    9.5      25 Mar 2019 15:39    TPro  7.9  /  Alb  4.1  /  TBili  0.2  /  DBili  x   /  AST  14  /  ALT  20  /  AlkPhos  87  03-25    PT/INR - ( 25 Mar 2019 15:39 )   PT: 12.3 sec;   INR: 1.09            RADIOLOGY, EKG & ADDITIONAL TESTS: Reviewed.
Overnight: BAILEY    Subjective: Pt seen and examined at bedside. R eye pain has resolved, but continues to have poor vision and redness.     Vital Signs Last 24 Hrs  T(C): 36.8 (27 Mar 2019 08:57), Max: 36.8 (27 Mar 2019 05:30)  T(F): 98.2 (27 Mar 2019 08:57), Max: 98.2 (27 Mar 2019 05:30)  HR: 54 (27 Mar 2019 08:57) (51 - 54)  BP: 132/83 (27 Mar 2019 08:57) (125/79 - 145/80)  BP(mean): --  RR: 18 (27 Mar 2019 08:57) (18 - 20)  SpO2: 97% (27 Mar 2019 08:57) (95% - 100%)    PHYSICAL EXAM:    Constitutional: WDWN resting comfortably in bed; NAD  Head: NC/AT  Eyes: LP w projection OD, 20/20 OS. pupils irregular b/l (surgical pupils), constrictive to light, +rAPD on Right. Diffuse conjunctival erythema OD improved from yesterday, clear conjunctiva OS. EOMI. No photophobia, discharge, nystagmus  ENT: no nasal discharge;  MMM  Respiratory: CTA B/L; no W/R/R, no retractions  Cardiac: +S1/S2; RRR; no M/R/G  Gastrointestinal: soft, NT/ND; no rebound or guarding; normoactive BS  Extremities: WWP, no clubbing or cyanosis; no peripheral edema  Dermatologic: skin warm, dry and intact; no rashes, wounds, or scars  Psychiatric: affect and characteristics of appearance, verbalizations, behaviors are appropriate    MEDICATIONS  (STANDING):  ciprofloxacin  0.3% Ophthalmic Solution 1 Drop(s) Right EYE four times a day  dextrose 5%. 1000 milliLiter(s) (50 mL/Hr) IV Continuous <Continuous>  dextrose 50% Injectable 12.5 Gram(s) IV Push once  dextrose 50% Injectable 25 Gram(s) IV Push once  dextrose 50% Injectable 25 Gram(s) IV Push once  insulin lispro (HumaLOG) corrective regimen sliding scale   SubCutaneous Before meals and at bedtime  methylPREDNISolone sodium succinate IVPB 1000 milliGRAM(s) IV Intermittent every 24 hours  pantoprazole    Tablet 40 milliGRAM(s) Oral before breakfast  prednisoLONE acetate 1% Suspension 1 Drop(s) Right EYE four times a day    MEDICATIONS  (PRN):  dextrose 40% Gel 15 Gram(s) Oral once PRN Blood Glucose LESS THAN 70 milliGRAM(s)/deciliter  glucagon  Injectable 1 milliGRAM(s) IntraMuscular once PRN Glucose LESS THAN 70 milligrams/deciliter    .  LABS:                         11.8   13.19 )-----------( 306      ( 27 Mar 2019 06:21 )             38.0     03-27    140  |  104  |  25<H>  ----------------------------<  161<H>  4.0   |  28  |  0.65    Ca    9.2      27 Mar 2019 06:18  Mg     2.2     03-27    TPro  7.9  /  Alb  4.1  /  TBili  0.2  /  DBili  x   /  AST  14  /  ALT  20  /  AlkPhos  87  03-25    PT/INR - ( 25 Mar 2019 15:39 )   PT: 12.3 sec;   INR: 1.09         RADIOLOGY, EKG & ADDITIONAL TESTS: Reviewed.

## 2019-03-27 NOTE — PROGRESS NOTE ADULT - PROBLEM SELECTOR PLAN 9
F: none  E: replete PRN  N: regular, CC  VTE: SCD (padua score 2 for recent surgery)  GI: PPI PO QD  dispo: F
F: none  E: replete PRN  N: regular  VTE: SCD (padua score 2 for recent surgery)  GI: PPI PO QD  dispo: F

## 2019-03-27 NOTE — PROGRESS NOTE ADULT - PROBLEM SELECTOR PLAN 4
pt with a hx of breast ca, s/p b/l mastectomy with reconstruction, chemo, and radiation in 2014  -NTD
pt with a hx of breast ca, s/p b/l mastectomy with reconstruction, chemo, and radiation in 2014  -NTD

## 2019-03-27 NOTE — PROGRESS NOTE ADULT - PROBLEM SELECTOR PLAN 10
Transition of Care   1) PCP Contacted on Admission: (Y/N) --> Name & Phone #: Dr. De Guzman (ophth), Dr. Mcginnis (neuroophth), N. Spoke to Dr. Armstrong, ophthalmologist on call   2) Date of Contact with PCP: Talked to Dr. Mcginnis 3/26  3) PCP Contacted at Discharge: (Y/N): Y  4) Summary of Handoff Given to PCP: Y  5) Post-Discharge Appointment Date and Location: Dr. De Guzman Transition of Care   1) PCP Contacted on Admission: (Y/N) --> Name & Phone #: Dr. De Guzman (ophth), Dr. Mcginnis (neuroophth), N. Spoke to Dr. Armstrong, ophthalmologist on call   2) Date of Contact with PCP: Talked to Dr. Mcginnis 3/26  3) PCP Contacted at Discharge: (Y/N): Y  4) Summary of Handoff Given to PCP: Y  5) Post-Discharge Appointment Date and Location: Dr. De Guzman 4/2 1pm

## 2019-03-27 NOTE — PROGRESS NOTE ADULT - ATTENDING COMMENTS
Patient was seen and examined with the resident team today.  I agree with Dr. 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  NAEO.  Reports no change in vision this AM.  Tolerating steroids.  Would like to be discharged w/same day appointment as she lives in [place].     -- Optho following, appreciate assistance   -- c/w IV steroids x 3 days (3/25-3/27)  -- monitor FS given IV steroids and pre-diatbetes A1c  -- will follow-up with Gab tomorrow afternoon  -- remainder of plan as per above  -- DVT PPx - SCD  -- Dispo - morning discharge on 3/28    Janie Méndez  [phone]

## 2019-03-27 NOTE — PROGRESS NOTE ADULT - PROBLEM SELECTOR PLAN 8
pt with a hx of syphilis, previously treated with abx  -NTD
pt with a hx of syphilis, previously treated with abx  -NTD

## 2019-03-27 NOTE — PROGRESS NOTE ADULT - PROBLEM SELECTOR PLAN 2
Pt with a hx of lens dislocation, s/p lens exchange on 3/20  -c/w prednisolone gtt OD QID  - moxifloxacin non-formulary, c/w cipro gtt OD QID
Pt with a hx of lens dislocation, s/p lens exchange on 3/20  -c/w prednisolone gtt OD QID  - moxifloxacin non-formulary, c/w cipro gtt OD QID

## 2019-03-28 ENCOUNTER — TRANSCRIPTION ENCOUNTER (OUTPATIENT)
Age: 54
End: 2019-03-28

## 2019-03-28 VITALS — WEIGHT: 123.46 LBS

## 2019-03-28 PROBLEM — Z86.69 PERSONAL HISTORY OF OTHER DISEASES OF THE NERVOUS SYSTEM AND SENSE ORGANS: Chronic | Status: ACTIVE | Noted: 2019-03-25

## 2019-03-28 PROBLEM — Z86.19 PERSONAL HISTORY OF OTHER INFECTIOUS AND PARASITIC DISEASES: Chronic | Status: ACTIVE | Noted: 2019-03-25

## 2019-03-28 PROBLEM — C50.919 MALIGNANT NEOPLASM OF UNSPECIFIED SITE OF UNSPECIFIED FEMALE BREAST: Chronic | Status: ACTIVE | Noted: 2019-03-25

## 2019-03-28 PROBLEM — H26.9 UNSPECIFIED CATARACT: Chronic | Status: ACTIVE | Noted: 2019-03-25

## 2019-03-28 PROBLEM — H27.10: Chronic | Status: ACTIVE | Noted: 2019-03-25

## 2019-03-28 PROBLEM — H35.81 RETINAL EDEMA: Chronic | Status: ACTIVE | Noted: 2019-03-25

## 2019-03-28 PROCEDURE — 80053 COMPREHEN METABOLIC PANEL: CPT

## 2019-03-28 PROCEDURE — 85027 COMPLETE CBC AUTOMATED: CPT

## 2019-03-28 PROCEDURE — 80048 BASIC METABOLIC PNL TOTAL CA: CPT

## 2019-03-28 PROCEDURE — 85025 COMPLETE CBC W/AUTO DIFF WBC: CPT

## 2019-03-28 PROCEDURE — 82962 GLUCOSE BLOOD TEST: CPT

## 2019-03-28 PROCEDURE — 83735 ASSAY OF MAGNESIUM: CPT

## 2019-03-28 PROCEDURE — 93005 ELECTROCARDIOGRAM TRACING: CPT

## 2019-03-28 PROCEDURE — 83036 HEMOGLOBIN GLYCOSYLATED A1C: CPT

## 2019-03-28 PROCEDURE — 86140 C-REACTIVE PROTEIN: CPT

## 2019-03-28 PROCEDURE — 36415 COLL VENOUS BLD VENIPUNCTURE: CPT

## 2019-03-28 PROCEDURE — 99239 HOSP IP/OBS DSCHRG MGMT >30: CPT

## 2019-03-28 PROCEDURE — 99285 EMERGENCY DEPT VISIT HI MDM: CPT | Mod: 25

## 2019-03-28 PROCEDURE — A9585: CPT

## 2019-03-28 PROCEDURE — 85610 PROTHROMBIN TIME: CPT

## 2019-03-28 PROCEDURE — 70543 MRI ORBT/FAC/NCK W/O &W/DYE: CPT

## 2019-03-28 PROCEDURE — 85652 RBC SED RATE AUTOMATED: CPT

## 2019-03-28 RX ORDER — PANTOPRAZOLE SODIUM 20 MG/1
1 TABLET, DELAYED RELEASE ORAL
Qty: 7 | Refills: 0 | OUTPATIENT
Start: 2019-03-28 | End: 2019-04-03

## 2019-03-28 RX ADMIN — PANTOPRAZOLE SODIUM 40 MILLIGRAM(S): 20 TABLET, DELAYED RELEASE ORAL at 06:33

## 2019-03-28 RX ADMIN — Medication 1 DROP(S): at 00:19

## 2019-03-28 RX ADMIN — Medication 1 DROP(S): at 12:54

## 2019-03-28 RX ADMIN — Medication 1 DROP(S): at 06:33

## 2019-03-28 NOTE — DISCHARGE NOTE PROVIDER - CARE PROVIDER_API CALL
COLTON De Guzman; SHERRI)  Ophthalmology  [address] [place] [address] [address]  Phone: [phone]  Fax: [phone]  Follow Up Time:

## 2019-03-28 NOTE — DIETITIAN INITIAL EVALUATION ADULT. - OTHER INFO
54F with a PMH of breast ca s/p chemo/radiation/b/l mastectomy with reconstruction in 2014, syphilis (s/p treatment), [place] palsy in 2016, uveitis, cataracts, retinal swelling, presenting with decreased vision, redness, and pain (6/10, dull, intermittent) of R eye since 3/21. On 3/20, pt went in for a R lens exchange for a dislocated lens. On post-op day 1 pt had noted she had no vision (no light perception) along with redness and pain. Pt reports she is doing well and eager for DC today, feels she needs to change her eating habits as she is on steroids which have caused her to "blow up" inpast, knows impact on BS levels, wanted information on consistent carb diet, discussed how to best balance meals through day. No noted n/v/d/c, chewing/ swallowing issues or pain impacting intake, fair-good tolerance noted at this time. NKFA or changes in wt PTA. Will continue to follow per protocol.

## 2019-03-28 NOTE — DIETITIAN INITIAL EVALUATION ADULT. - PROBLEM SELECTOR PLAN 4
pt with a hx of breast ca, s/p b/l mastectomy with reconstruction, chemo, and radiation in 2014  -NTD

## 2019-03-28 NOTE — DIETITIAN INITIAL EVALUATION ADULT. - PROBLEM SELECTOR PLAN 2
Pt with a hx of lens dislocation, s/p lens exchange on 3/20  -c/w prednisolone gtt OD QID  - moxifloxacin non-formulary, c/w cipro gtt OD QID

## 2019-03-28 NOTE — DIETITIAN INITIAL EVALUATION ADULT. - NS AS NUTRI INTERV ED CONTENT
Purpose of the nutrition education/discussed consistent carb diet to best manage BS levels on steroids

## 2019-03-28 NOTE — DISCHARGE NOTE PROVIDER - HOSPITAL COURSE
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 Pt treated with IV solumedrol 1g QD x 3 days. Pain and conjunctival erythema improved. Vision improved slightly to light perception with projection. Pt hemodynamically stable for discharge with close outpatient followup.

## 2019-03-28 NOTE — DIETITIAN INITIAL EVALUATION ADULT. - PROBLEM SELECTOR PLAN 9
F: none  E: replete PRN  N: regular  VTE: SCD (padua score 2 for recent sugery)  GI: PPI PO QD  dispo: F

## 2019-03-28 NOTE — DIETITIAN INITIAL EVALUATION ADULT. - PROBLEM SELECTOR PLAN 1
Pt with sudden onset redness, pain, and NLP on post-op day 1 of lens exchange. Vision improved slightly with PO steroids, now Light perception. no APD noted on exam.  Differential includes inflammatory (MS, NMO) vs ischemic (no risk factors) vs infectious (not febrile, no leukocytosis), Pt denies peripheral neuropathy, facial droop, or dysarthria. However does endorse occasional vertigo since february and one fall in february 2/2 knee weakness.   -1g solumedrol QD x3 days with daily PPI   -MR orbit urgent  -appreciate ophtho recs  -call dr. flores in the AM for collateral

## 2019-03-28 NOTE — DISCHARGE NOTE NURSING/CASE MANAGEMENT/SOCIAL WORK - NSDCDPATPORTLINK_GEN_ALL_CORE
You can access the [facility] Patient Portal, offered by [facility], by registering with the following website: [email or web address]

## 2019-03-28 NOTE — DISCHARGE NOTE PROVIDER - NSDCCPCAREPLAN_GEN_ALL_CORE_FT
PRINCIPAL DISCHARGE DIAGNOSIS  Diagnosis: Optic neuropathy  Assessment and Plan of Treatment: You were admitted to the hospital because of pain, redness, and low vision in your right eye. This was probably from damage to your optic nerve when you received anesthesia injection for surgery. You will see Dr. De Guzman on 4/2/19 for followup. Continue your prednisone and moxifloxacin eye drops, and continue prednisone 60mg daily as prescribed by Dr. De Guzman. We have also sent you 7 days of pantoprazole to take until you see Dr. De Guzman to protect your stomach while you're taking prednisone. He will decide if you need to continue this medication.      SECONDARY DISCHARGE DIAGNOSES  Diagnosis: Prediabetes  Assessment and Plan of Treatment: You had a hemoglobin A1c level of 6.0, which puts you at risk for developing diabetes. Please follow up with your primary doctor

## 2019-03-28 NOTE — DIETITIAN INITIAL EVALUATION ADULT. - ENERGY NEEDS
Ideal body weight used for calculations as pt >120% of IBW.   ABW 79.8kg, IBW 56kg, 141% IBW, ht 65", BMI 29.3   Nutrient needs based on [facility] standards of care for maintenance in adults.

## 2019-04-01 ENCOUNTER — RX RENEWAL (OUTPATIENT)
Age: 54
End: 2019-04-01

## 2019-04-03 DIAGNOSIS — Z92.3 PERSONAL HISTORY OF IRRADIATION: ICD-10-CM

## 2019-04-03 DIAGNOSIS — H59.89 OTHER POSTPROCEDURAL COMPLICATIONS AND DISORDERS OF EYE AND ADNEXA, NOT ELSEWHERE CLASSIFIED: ICD-10-CM

## 2019-04-03 DIAGNOSIS — Z86.19 PERSONAL HISTORY OF OTHER INFECTIOUS AND PARASITIC DISEASES: ICD-10-CM
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
## 2019-04-03 DIAGNOSIS — Z90.10 ACQUIRED ABSENCE OF UNSPECIFIED BREAST AND NIPPLE: ICD-10-CM

## 2019-04-03 DIAGNOSIS — H46.9 UNSPECIFIED OPTIC NEURITIS: ICD-10-CM
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
## 2019-04-03 DIAGNOSIS — Z92.21 PERSONAL HISTORY OF ANTINEOPLASTIC CHEMOTHERAPY: ICD-10-CM

## 2019-04-03 DIAGNOSIS — R42 DIZZINESS AND GIDDINESS: ICD-10-CM

## 2019-04-03 DIAGNOSIS — H26.9 UNSPECIFIED CATARACT: ICD-10-CM

## 2019-04-03 DIAGNOSIS — S04.011A INJURY OF OPTIC NERVE, RIGHT EYE, INITIAL ENCOUNTER: ICD-10-CM

## 2019-04-03 DIAGNOSIS — R73.03 PREDIABETES: ICD-10-CM

## 2019-04-03 DIAGNOSIS — Z85.3 PERSONAL HISTORY OF MALIGNANT NEOPLASM OF BREAST: ICD-10-CM

## 2019-04-04 ENCOUNTER — APPOINTMENT (OUTPATIENT)
Dept: OPHTHALMOLOGY | Facility: CLINIC | Age: 54
End: 2019-04-04
Payer: MEDICARE

## 2019-04-04 PROCEDURE — 99024 POSTOP FOLLOW-UP VISIT: CPT

## 2019-04-05 ENCOUNTER — APPOINTMENT (OUTPATIENT)
Dept: OPHTHALMOLOGY | Facility: CLINIC | Age: 54
End: 2019-04-05
Payer: MEDICARE

## 2019-04-05 DIAGNOSIS — Z96.1 PRESENCE OF INTRAOCULAR LENS: ICD-10-CM

## 2019-04-05 DIAGNOSIS — H10.9 UNSPECIFIED CONJUNCTIVITIS: ICD-10-CM

## 2019-04-05 DIAGNOSIS — H35.351 CYSTOID MACULAR DEGENERATION, RIGHT EYE: ICD-10-CM

## 2019-04-05 DIAGNOSIS — H27.121 ANTERIOR DISLOCATION OF LENS, RIGHT EYE: ICD-10-CM

## 2019-04-05 PROCEDURE — 99024 POSTOP FOLLOW-UP VISIT: CPT

## 2019-04-08 ENCOUNTER — APPOINTMENT (OUTPATIENT)
Dept: OPHTHALMOLOGY | Facility: CLINIC | Age: 54
End: 2019-04-08
Payer: MEDICARE

## 2019-04-08 DIAGNOSIS — H46.9 UNSPECIFIED OPTIC NEURITIS: ICD-10-CM

## 2019-04-08 PROCEDURE — 92014 COMPRE OPH EXAM EST PT 1/>: CPT | Mod: 24

## 2019-05-06 ENCOUNTER — RX RENEWAL (OUTPATIENT)
Age: 54
End: 2019-05-06

## 2019-05-06 RX ORDER — PANTOPRAZOLE 40 MG/1
40 TABLET, DELAYED RELEASE ORAL DAILY
Qty: 90 | Refills: 3 | Status: ACTIVE | COMMUNITY
Start: 2019-04-01 | End: 1900-01-01

## 2019-05-16 ENCOUNTER — APPOINTMENT (OUTPATIENT)
Dept: OPHTHALMOLOGY | Facility: CLINIC | Age: 54
End: 2019-05-16

## 2019-07-12 ENCOUNTER — APPOINTMENT (OUTPATIENT)
Dept: OPHTHALMOLOGY | Facility: CLINIC | Age: 54
End: 2019-07-12

## 2020-04-09 NOTE — PROGRESS NOTE ADULT - PROBLEM SELECTOR PLAN 3
Attempted to call Mrs Bach there was no answer a message was left if she wishes to call the unit back to get an update
pt with hx of uveitis   -NTD
pt with hx of uveitis   -NTD

## 2020-09-10 ENCOUNTER — APPOINTMENT (OUTPATIENT)
Dept: ORTHOPEDIC SURGERY | Facility: CLINIC | Age: 55
End: 2020-09-10
Payer: MEDICARE

## 2020-09-10 VITALS — TEMPERATURE: 95.6 F

## 2020-09-10 DIAGNOSIS — M54.30 SCIATICA, UNSPECIFIED SIDE: ICD-10-CM

## 2020-09-10 PROCEDURE — 99204 OFFICE O/P NEW MOD 45 MIN: CPT

## 2020-09-10 RX ORDER — CELECOXIB 200 MG/1
200 CAPSULE ORAL DAILY
Qty: 30 | Refills: 0 | Status: ACTIVE | COMMUNITY
Start: 2020-09-10 | End: 1900-01-01

## 2020-09-10 NOTE — HISTORY OF PRESENT ILLNESS
[de-identified] : First time visit for this patient she is here with a one-month history of right hip pain. She recalls no specific accident injury pain is well localized on the outside part of her right hip. It is nonradiating.

## 2020-09-10 NOTE — PHYSICAL EXAM
[de-identified] : AP and lateral lumbar spine show only minimal degenerative changes of the lower lumbar segments. AP lateral of both hips showed minimal degenerative arthritis both [de-identified] : Patient on exam today has full passive internal/external rotation. There is no restriction to motion or pain. She does have tenderness to palpation directly at the greater trochanter.

## 2020-09-10 NOTE — REASON FOR VISIT
[Initial Visit] : an initial visit for [FreeTextEntry2] : RIGHT HIP AND LOW BACK PAIN - STARTED ONE MONTH AGO - NO RECENT FALLS - SENT FOR NEW XRAYS

## 2020-09-10 NOTE — DISCUSSION/SUMMARY
[Medication Risks Reviewed] : Medication risks reviewed [de-identified] : Patient's clinical exam and history offices of right-sided trochanteric bursitis. We'll place her on Celebrex 200 mg to be taken twice a day and thereafter as needed. She will return a week for cortisone injection if not improved

## 2020-10-08 RX ORDER — CELECOXIB 200 MG/1
200 CAPSULE ORAL DAILY
Qty: 40 | Refills: 0 | Status: ACTIVE | COMMUNITY
Start: 2020-10-08 | End: 1900-01-01

## 2020-10-09 DIAGNOSIS — M25.559 PAIN IN UNSPECIFIED HIP: ICD-10-CM

## 2020-10-09 RX ORDER — MELOXICAM 15 MG/1
15 TABLET ORAL
Qty: 90 | Refills: 0 | Status: ACTIVE | COMMUNITY
Start: 2020-10-09 | End: 1900-01-01

## 2020-10-23 ENCOUNTER — APPOINTMENT (OUTPATIENT)
Dept: PHYSICAL MEDICINE AND REHAB | Facility: CLINIC | Age: 55
End: 2020-10-23

## 2020-12-21 PROBLEM — H10.9 CONJUNCTIVITIS, LEFT EYE: Status: RESOLVED | Noted: 2019-04-05 | Resolved: 2020-12-21

## 2021-08-06 NOTE — ED ADULT NURSE NOTE - NSIMPLEMENTINTERV_GEN_ALL_ED
Patient calling to inform that her pharmacy has not received the order for her prescription on Diazepam 10 MG and she need to take this prior to her injection on Monday. She will like a call to inform when this has been send to the pharmacy. Implemented All Universal Safety Interventions:  Harmony to call system. Call bell, personal items and telephone within reach. Instruct patient to call for assistance. Room bathroom lighting operational. Non-slip footwear when patient is off stretcher. Physically safe environment: no spills, clutter or unnecessary equipment. Stretcher in lowest position, wheels locked, appropriate side rails in place.

## 2021-12-13 NOTE — DIETITIAN INITIAL EVALUATION ADULT. - CURRENT DIET ORDER MEETS ESTIMATED NUTRIENT REQUIREMENTS
Patient has not been seen since previous CA-20 form completed. Follow up appointment on 01/13/2022   Statement Selected

## 2022-01-20 NOTE — PROGRESS NOTE ADULT - PROBLEM SELECTOR PROBLEM 10
at bedside
Transition of care performed with sharing of clinical summary
Transition of care performed with sharing of clinical summary

## 2023-12-18 NOTE — PROGRESS NOTE ADULT - PROVIDER SPECIALTY LIST ADULT
12/29/2023 Pt left a voice message following up on refill requests. [phone]    -----------------------------------------------  Pt left a voice message requesting refills. [phone]    Last appointment: 09/26/2023 MD Munira Verdin   Next appointment: 01/10/2024 MD Munira Verdin   Previous refill encounter(s):   05/17/2023 Naprosyn #60,   07/13/2023 Norvasc #90.      For Pharmacy Admin Tracking Only    Program: Medication Refill  Intervention Detail: New Rx: 2, reason: Patient Preference  Time Spent (min): 5      Requested Prescriptions     Pending Prescriptions Disp Refills    amLODIPine (NORVASC) 10 MG tablet [Pharmacy Med Name: amLODIPine Besylate 10 MG Oral Tablet] 90 tablet 0     Sig: Take 1 tablet by mouth once daily    naproxen (NAPROSYN) 500 MG tablet 60 tablet 0     Sig: Take 1 tablet by mouth 2 times daily (with meals)
Courtesy call made to patient to follow up after her visit with us. Patient states she is still very sick and hurting but she has an appointment  this coming week with her doctor.  
Internal Medicine
Internal Medicine

## 2024-01-02 NOTE — ED PROVIDER NOTE - NS ED MD EM SELECTION
01/02/24                            Katherin Delgadillo  [address]  [facility] [address]    To Whom It May Concern:    This is to certify Katherin Delgadillo was evaluated with Saniya Hylton PA-C on 01/02/24. Please excuse patient 1/2/24.          Electronically signed by:  Saniya Hylton PA-C  [address] [address]  Dept Phone: [phone]        [id number] [facility]

## 2025-05-02 NOTE — ED PROVIDER NOTE - CARE PLAN
Scar here for 5FU Pump disconnect.     Patient denies any issues or concerns with pump prior to disconnection. Elastomeric pump appears to be deflated and empty upon disconnect. Positive blood return noted prior to pump disconnect.     Patient c/o mild cold sensitivity that started night of the treatment. Patient also c/o jaw tingling when eating, jaw tingling lasts only for couple of minutes and subsides.   Pt advised jaw tinging related to Oxaliplatin side effect. Patient reports cold sensitivity is tolerable at this time. Cold precautions reinforced. Patient to monitor and call if symptom gets worse. Patient agreeable and verbalize understanding.   Patient denies any nausea/vomiting. Patient reports patient is eating/drinking well at this time. Patient continues to c/o constipation, nothing worse since starting treatment. Patient had last BM yesterday and passing gas at this time. Patient to take Miralax/or Metamucil and Stool softener as needed for constipation. Patient agreeable and verbalize understanding.     Central line care provided per [facility] policy and procedure. See MAR and [facility] for full details.     Labs drawn this visit? N/A    Neulasta on-body injector: No:      Discharged: Stable.    Patient to return to clinic as planned on 5/14/2025. Patient aware and verbalize understanding.   
Principal Discharge DX:	Traumatic optic neuropathy